# Patient Record
Sex: MALE | HISPANIC OR LATINO | URBAN - METROPOLITAN AREA
[De-identification: names, ages, dates, MRNs, and addresses within clinical notes are randomized per-mention and may not be internally consistent; named-entity substitution may affect disease eponyms.]

---

## 2022-04-08 ENCOUNTER — EMERGENCY (EMERGENCY)
Facility: HOSPITAL | Age: 47
LOS: 1 days | Discharge: HOME | End: 2022-04-08
Attending: STUDENT IN AN ORGANIZED HEALTH CARE EDUCATION/TRAINING PROGRAM | Admitting: STUDENT IN AN ORGANIZED HEALTH CARE EDUCATION/TRAINING PROGRAM
Payer: MEDICAID

## 2022-04-08 VITALS
SYSTOLIC BLOOD PRESSURE: 123 MMHG | HEART RATE: 79 BPM | TEMPERATURE: 98 F | OXYGEN SATURATION: 100 % | RESPIRATION RATE: 16 BRPM | DIASTOLIC BLOOD PRESSURE: 95 MMHG

## 2022-04-08 VITALS
TEMPERATURE: 97 F | HEART RATE: 88 BPM | OXYGEN SATURATION: 100 % | RESPIRATION RATE: 20 BRPM | SYSTOLIC BLOOD PRESSURE: 132 MMHG | DIASTOLIC BLOOD PRESSURE: 85 MMHG

## 2022-04-08 DIAGNOSIS — Z90.49 ACQUIRED ABSENCE OF OTHER SPECIFIED PARTS OF DIGESTIVE TRACT: Chronic | ICD-10-CM

## 2022-04-08 LAB
ALBUMIN SERPL ELPH-MCNC: 4.5 G/DL — SIGNIFICANT CHANGE UP (ref 3.3–5)
ALP SERPL-CCNC: 87 U/L — SIGNIFICANT CHANGE UP (ref 40–120)
ALT FLD-CCNC: 29 U/L — SIGNIFICANT CHANGE UP (ref 4–41)
ANION GAP SERPL CALC-SCNC: 15 MMOL/L — HIGH (ref 7–14)
APPEARANCE UR: CLEAR — SIGNIFICANT CHANGE UP
AST SERPL-CCNC: 20 U/L — SIGNIFICANT CHANGE UP (ref 4–40)
BACTERIA # UR AUTO: NEGATIVE — SIGNIFICANT CHANGE UP
BASOPHILS # BLD AUTO: 0.07 K/UL — SIGNIFICANT CHANGE UP (ref 0–0.2)
BASOPHILS NFR BLD AUTO: 0.7 % — SIGNIFICANT CHANGE UP (ref 0–2)
BILIRUB SERPL-MCNC: 0.2 MG/DL — SIGNIFICANT CHANGE UP (ref 0.2–1.2)
BILIRUB UR-MCNC: NEGATIVE — SIGNIFICANT CHANGE UP
BUN SERPL-MCNC: 14 MG/DL — SIGNIFICANT CHANGE UP (ref 7–23)
CALCIUM SERPL-MCNC: 9.1 MG/DL — SIGNIFICANT CHANGE UP (ref 8.4–10.5)
CHLORIDE SERPL-SCNC: 101 MMOL/L — SIGNIFICANT CHANGE UP (ref 98–107)
CO2 SERPL-SCNC: 22 MMOL/L — SIGNIFICANT CHANGE UP (ref 22–31)
COLOR SPEC: YELLOW — SIGNIFICANT CHANGE UP
CREAT SERPL-MCNC: 1.08 MG/DL — SIGNIFICANT CHANGE UP (ref 0.5–1.3)
DIFF PNL FLD: ABNORMAL
EGFR: 85 ML/MIN/1.73M2 — SIGNIFICANT CHANGE UP
EOSINOPHIL # BLD AUTO: 0.34 K/UL — SIGNIFICANT CHANGE UP (ref 0–0.5)
EOSINOPHIL NFR BLD AUTO: 3.4 % — SIGNIFICANT CHANGE UP (ref 0–6)
EPI CELLS # UR: 0 /HPF — SIGNIFICANT CHANGE UP (ref 0–5)
GLUCOSE SERPL-MCNC: 85 MG/DL — SIGNIFICANT CHANGE UP (ref 70–99)
GLUCOSE UR QL: NEGATIVE — SIGNIFICANT CHANGE UP
HCT VFR BLD CALC: 43.4 % — SIGNIFICANT CHANGE UP (ref 39–50)
HGB BLD-MCNC: 14.7 G/DL — SIGNIFICANT CHANGE UP (ref 13–17)
HYALINE CASTS # UR AUTO: 1 /LPF — SIGNIFICANT CHANGE UP (ref 0–7)
IANC: 6.21 K/UL — SIGNIFICANT CHANGE UP (ref 1.8–7.4)
IMM GRANULOCYTES NFR BLD AUTO: 0.5 % — SIGNIFICANT CHANGE UP (ref 0–1.5)
KETONES UR-MCNC: NEGATIVE — SIGNIFICANT CHANGE UP
LEUKOCYTE ESTERASE UR-ACNC: NEGATIVE — SIGNIFICANT CHANGE UP
LYMPHOCYTES # BLD AUTO: 2.46 K/UL — SIGNIFICANT CHANGE UP (ref 1–3.3)
LYMPHOCYTES # BLD AUTO: 24.4 % — SIGNIFICANT CHANGE UP (ref 13–44)
MCHC RBC-ENTMCNC: 32 PG — SIGNIFICANT CHANGE UP (ref 27–34)
MCHC RBC-ENTMCNC: 33.9 GM/DL — SIGNIFICANT CHANGE UP (ref 32–36)
MCV RBC AUTO: 94.6 FL — SIGNIFICANT CHANGE UP (ref 80–100)
MONOCYTES # BLD AUTO: 0.94 K/UL — HIGH (ref 0–0.9)
MONOCYTES NFR BLD AUTO: 9.3 % — SIGNIFICANT CHANGE UP (ref 2–14)
NEUTROPHILS # BLD AUTO: 6.21 K/UL — SIGNIFICANT CHANGE UP (ref 1.8–7.4)
NEUTROPHILS NFR BLD AUTO: 61.7 % — SIGNIFICANT CHANGE UP (ref 43–77)
NITRITE UR-MCNC: NEGATIVE — SIGNIFICANT CHANGE UP
NRBC # BLD: 0 /100 WBCS — SIGNIFICANT CHANGE UP
NRBC # FLD: 0 K/UL — SIGNIFICANT CHANGE UP
PH UR: 5.5 — SIGNIFICANT CHANGE UP (ref 5–8)
PLATELET # BLD AUTO: 363 K/UL — SIGNIFICANT CHANGE UP (ref 150–400)
POTASSIUM SERPL-MCNC: 4 MMOL/L — SIGNIFICANT CHANGE UP (ref 3.5–5.3)
POTASSIUM SERPL-SCNC: 4 MMOL/L — SIGNIFICANT CHANGE UP (ref 3.5–5.3)
PROT SERPL-MCNC: 7.6 G/DL — SIGNIFICANT CHANGE UP (ref 6–8.3)
PROT UR-MCNC: ABNORMAL
RBC # BLD: 4.59 M/UL — SIGNIFICANT CHANGE UP (ref 4.2–5.8)
RBC # FLD: 13.6 % — SIGNIFICANT CHANGE UP (ref 10.3–14.5)
RBC CASTS # UR COMP ASSIST: 39 /HPF — HIGH (ref 0–4)
SODIUM SERPL-SCNC: 138 MMOL/L — SIGNIFICANT CHANGE UP (ref 135–145)
SP GR SPEC: 1.02 — SIGNIFICANT CHANGE UP (ref 1–1.05)
UROBILINOGEN FLD QL: SIGNIFICANT CHANGE UP
WBC # BLD: 10.07 K/UL — SIGNIFICANT CHANGE UP (ref 3.8–10.5)
WBC # FLD AUTO: 10.07 K/UL — SIGNIFICANT CHANGE UP (ref 3.8–10.5)
WBC UR QL: 3 /HPF — SIGNIFICANT CHANGE UP (ref 0–5)

## 2022-04-08 PROCEDURE — 74176 CT ABD & PELVIS W/O CONTRAST: CPT | Mod: 26,GC,MB

## 2022-04-08 PROCEDURE — 99285 EMERGENCY DEPT VISIT HI MDM: CPT

## 2022-04-08 RX ORDER — MORPHINE SULFATE 50 MG/1
4 CAPSULE, EXTENDED RELEASE ORAL ONCE
Refills: 0 | Status: DISCONTINUED | OUTPATIENT
Start: 2022-04-08 | End: 2022-04-08

## 2022-04-08 RX ORDER — KETOROLAC TROMETHAMINE 30 MG/ML
15 SYRINGE (ML) INJECTION ONCE
Refills: 0 | Status: DISCONTINUED | OUTPATIENT
Start: 2022-04-08 | End: 2022-04-08

## 2022-04-08 RX ORDER — SODIUM CHLORIDE 9 MG/ML
1000 INJECTION INTRAMUSCULAR; INTRAVENOUS; SUBCUTANEOUS ONCE
Refills: 0 | Status: COMPLETED | OUTPATIENT
Start: 2022-04-08 | End: 2022-04-08

## 2022-04-08 RX ADMIN — Medication 15 MILLIGRAM(S): at 20:30

## 2022-04-08 RX ADMIN — MORPHINE SULFATE 4 MILLIGRAM(S): 50 CAPSULE, EXTENDED RELEASE ORAL at 21:59

## 2022-04-08 RX ADMIN — SODIUM CHLORIDE 2000 MILLILITER(S): 9 INJECTION INTRAMUSCULAR; INTRAVENOUS; SUBCUTANEOUS at 20:30

## 2022-04-08 NOTE — ED PROVIDER NOTE - NSFOLLOWUPINSTRUCTIONS_ED_ALL_ED_FT
Joseph ibuprofeno 400 mg y acetaminophen 650 mg cada 6 horas con comida  si tenga dolor.     Cómo filtrar peñaloza orina    LO QUE NECESITA SABER:    Orine en un colador (embudo con junie ronda veronica en la parte inferior) o un recipiente de [place]ston para recoger los cálculos renales.  Buscar piedras en el filtro    INSTRUCCIONES SOBRE EL ATUL HOSPITALARIA:    Medicamentos:    Analgésicos:Usted podría recibir medicamento para quitarle o reducir el dolor. No espere a que el dolor sea muy intenso para kelsey el medicamento.    AINEs (analgésicos antiinflamatorios no esteroides):Estos medicamentos disminuyen la inflamación, dolor y fiebre. Los AINEs se pueden obtener sin receta médica. Pregunte cuál de estos medicamentos es apropiado para usted. Pregunte cuánto kelsey y cuándo. Tómelos rupert se le indique. Cuando no se mickey de la manera indicada, los medicamentos antiinflamatorios no esteroides pueden causar sangrado estomacal y problemas renales.    Medicamento para las náuseas:Tiny medicamento calma el estómago y claudy o controla los vómitos.    [place] linda medicamentos rupert se le haya indicado.Consulte con peñaloza médico si usted juan c que peñaloza medicamento no le está ayudando o si presenta efectos secundarios. Infórmele si es alérgico a algún medicamento. Mantenga junie lista actualizada de los medicamentos, las vitaminas y los productos herbales que joseph. Incluya los siguientes datos de los medicamentos: cantidad, frecuencia y motivo de administración. Traiga con usted la lista o los envases de las píldoras a linda citas de seguimiento. Lleve la lista de los medicamentos con usted en navi de junie emergencia.  [place] líquidos según linda indicaciones:Elisabet unos 3 litros de líquido cada día o la cantidad que le indiquen. [place] equivale a aproximadamente 12 vasos de agua o jugo de fruta. La mitad de la cantidad total de líquido que joseph a diario debe ser agua. Limite la cantidad de café, té y gaseosas que joseph a 2 vasos al día. Peñaloza orina será pálida y denis si está bebiendo suficiente líquido.    Cuidados personales:    Actividad:El ejercicio, rupert caminar, puede ayudar a disminuir el dolor.    Evite el calor:Es posible que transpire, orine menos y se deshidrate cuando siente calor.  Programe junie enmanuel con peñaloza médico o urólogo rupert se le indique:Anote linda preguntas para que se acuerde de hacerlas caitlin linda visitas.    Comuníquese con peñaloza médico o urólogo si:    Usted tiene fiebre y escalofríos.    Peñaloza orina es turbia o huele mal.    Siente ardor cuando orina.    Tiene dificultad para orinar.    Tiene vómitos que no mejoran, incluso después de kelsey el medicamento.    Usted tiene preguntas o inquietudes acerca de peñaloza condición o cuidado.  Regrese a la remi de emergencias si:    Usted no puede orinar.    Siente un dolor muy intenso en la parte inferior del abdomen o en el costado.    Peñaloza corazón late de forma irregular o más rápido que de costumbre.

## 2022-04-08 NOTE — ED PROVIDER NOTE - PATIENT PORTAL LINK FT
You can access the FollowMyHealth Patient Portal offered by [facility] by registering at the following website: [email or web address]. By joining [facility]’s FollowMyHealth portal, you will also be able to view your health information using other applications (apps) compatible with our system.

## 2022-04-08 NOTE — ED ADULT NURSE NOTE - OBJECTIVE STATEMENT
pt. received to int. 3 A&Ox4 ambulatory cares for self p/w L sided flank pain. pt. endorses pain started this morning with no relief. rates is as constant, sharp pain at a 10. endorses increased urinary frequency and denies burning or blood in the urine. respirations even and unlabored. pt. visibly uncomfortable at this time. denies CP, SOB, HA, NVD, Fever, Chills. 20g IV placed in the R AC. Labs drawn and sent. pt. medicated as ordered. comfort measures provided. safety precautions maintained.

## 2022-04-08 NOTE — ED PROVIDER NOTE - NS ED ROS FT
CONSTITUTIONAL: No fever,  EYES: No redness  ENT: no sore throat  CARDIOVASCULAR: No chest pain,  RESPIRATORY: No cough, no shortness of breath  GI: +flank pain, no nausea, no vomiting,  GENITOURINARY: +urinary hesitance, no testicular pain  MUSKULOSKELETAL: No new pain in joints/muscles  SKIN: No rash  NEURO: No headache  ALL OTHER SYSTEMS NEGATIVE.

## 2022-04-08 NOTE — ED PROVIDER NOTE - PROGRESS NOTE DETAILS
pain well controlled, discharge with uro follow up. Return precautions reviewed. Patient verbalized understand of conditions for return and plan for follow up.

## 2022-04-08 NOTE — ED ADULT TRIAGE NOTE - CHIEF COMPLAINT QUOTE
left sided back pain with radiations to left lower abdominal pain. pt urinating blood, states he is unable to empty bladder. pain started today at 2am. pt uncomfortable

## 2022-04-08 NOTE — ED PROVIDER NOTE - CLINICAL SUMMARY MEDICAL DECISION MAKING FREE TEXT BOX
47 y/o M w/ PMHx of appendicitis s/p appendectomy c/o severe sudden onset left flank pain radiating to the left lower quadrant since 2 AM today. Most likely renal stone. CT scan has been ordered. Low concern for intraabdominal surgical pathology. Will reassess after pain control.

## 2022-04-08 NOTE — ED PROVIDER NOTE - PHYSICAL EXAMINATION
CONSTITUTIONAL: Non-toxic, non-diaphoretic, in no apparent distress  HEAD: Normocephalic; atruamatic  EYES: EOM intact   ENMT: External appears normal; normal oropharynx, moist  NECK: grossly normal active ROM,  CARD: No cyanosis, good peripheral perfusion, RRR  RESP: Normal chest excursion with respiration; no increased work of breathing  ABD: abdomen soft and nontender, minimal left flank tenderness  EXT: moving all extremities, no gross disfigurement or asymmetry,  SKIN: Warm, dry, no rash  NEURO:  moving all extremities, no facial droop, no dysarthria      cn2-12 intact  5/5 all extremities

## 2022-04-08 NOTE — ED PROVIDER NOTE - OBJECTIVE STATEMENT
47 y/o M w/ PMHx of appendicitis s/p appendectomy c/o severe sudden onset left flank pain radiating to the left lower quadrant since 2 AM today. Pt states that he took an Advil upon waking up but that the pain has continued to be persistent at work so he has presented to the ED. Pt also reports having blood tinged urine. Pt denies testicular pain, fever, or any other symptoms at this time. I speak the patients language (Swedish) fluently, therefore no  was required.

## 2022-04-09 LAB
CULTURE RESULTS: SIGNIFICANT CHANGE UP
SPECIMEN SOURCE: SIGNIFICANT CHANGE UP

## 2022-04-18 PROBLEM — K37 UNSPECIFIED APPENDICITIS: Chronic | Status: ACTIVE | Noted: 2022-04-08

## 2022-04-29 ENCOUNTER — APPOINTMENT (OUTPATIENT)
Dept: UROLOGY | Facility: CLINIC | Age: 47
End: 2022-04-29

## 2024-04-02 ENCOUNTER — LABORATORY RESULT (OUTPATIENT)
Age: 49
End: 2024-04-02

## 2024-04-02 ENCOUNTER — OUTPATIENT (OUTPATIENT)
Dept: OUTPATIENT SERVICES | Facility: HOSPITAL | Age: 49
LOS: 1 days | End: 2024-04-02
Payer: SELF-PAY

## 2024-04-02 ENCOUNTER — APPOINTMENT (OUTPATIENT)
Dept: INTERNAL MEDICINE | Facility: CLINIC | Age: 49
End: 2024-04-02
Payer: SELF-PAY

## 2024-04-02 VITALS
DIASTOLIC BLOOD PRESSURE: 82 MMHG | OXYGEN SATURATION: 97 % | HEIGHT: 71 IN | BODY MASS INDEX: 37.24 KG/M2 | HEART RATE: 87 BPM | WEIGHT: 266 LBS | SYSTOLIC BLOOD PRESSURE: 124 MMHG

## 2024-04-02 DIAGNOSIS — Z90.49 ACQUIRED ABSENCE OF OTHER SPECIFIED PARTS OF DIGESTIVE TRACT: Chronic | ICD-10-CM

## 2024-04-02 DIAGNOSIS — I10 ESSENTIAL (PRIMARY) HYPERTENSION: ICD-10-CM

## 2024-04-02 DIAGNOSIS — Z83.438 FAMILY HISTORY OF OTHER DISORDER OF LIPOPROTEIN METABOLISM AND OTHER LIPIDEMIA: ICD-10-CM

## 2024-04-02 DIAGNOSIS — E66.9 OBESITY, UNSPECIFIED: ICD-10-CM

## 2024-04-02 DIAGNOSIS — R06.83 SNORING: ICD-10-CM

## 2024-04-02 DIAGNOSIS — Z82.49 FAMILY HISTORY OF ISCHEMIC HEART DISEASE AND OTHER DISEASES OF THE CIRCULATORY SYSTEM: ICD-10-CM

## 2024-04-02 DIAGNOSIS — Z00.00 ENCOUNTER FOR GENERAL ADULT MEDICAL EXAMINATION W/OUT ABNORMAL FINDINGS: ICD-10-CM

## 2024-04-02 DIAGNOSIS — M25.562 PAIN IN RIGHT KNEE: ICD-10-CM

## 2024-04-02 DIAGNOSIS — M25.512 PAIN IN LEFT SHOULDER: ICD-10-CM

## 2024-04-02 DIAGNOSIS — M25.561 PAIN IN RIGHT KNEE: ICD-10-CM

## 2024-04-02 PROCEDURE — 80061 LIPID PANEL: CPT

## 2024-04-02 PROCEDURE — T1013: CPT

## 2024-04-02 PROCEDURE — 87389 HIV-1 AG W/HIV-1&-2 AB AG IA: CPT

## 2024-04-02 PROCEDURE — 83036 HEMOGLOBIN GLYCOSYLATED A1C: CPT

## 2024-04-02 PROCEDURE — 99214 OFFICE O/P EST MOD 30 MIN: CPT | Mod: GC

## 2024-04-02 PROCEDURE — 86803 HEPATITIS C AB TEST: CPT

## 2024-04-02 PROCEDURE — 87521 HEPATITIS C PROBE&RVRS TRNSC: CPT

## 2024-04-02 PROCEDURE — G0463: CPT

## 2024-04-04 NOTE — END OF VISIT
[] : Resident [FreeTextEntry3] : The patient is a 48-year-old man with a history of nephrolithiasis who is presenting to [facility] care as a new patient and who has noted symptoms of shoulder pain (most likely secondary to rotator cuff tendinopathy), knee pain (likely secondary to osteoarthritis), and difficulty sleeping (most likely in the setting of sleep apnea).    Rotator cuff tendinopathy: -Pain noted in proximal humerus of left upper extremity; pain reproduced on exam with action of the supraspinatus and subscapularis; pain not reproduced with action of biceps, triceps, deltoids, infraspinatus; mild deficits to passive range of motion   -Suspect pain most likely secondary to tendinopathy of supraspinatus; less likely pathology of the glenoid labrum; suspect pain localized to proximal humerus is referred; will send patient for x ray but low suspicion that bony pathology is etiology of symptoms  -Will refer to physical therapy for management of tendinopathy   Knee pain: -Pain in medial knees bilaterally in setting of progressive weight gain  -Suspect that knee pain is most likely secondary to osteoarthritis in setting of obesity  -Physical therapy referral made; will continue to work with patient to try to mitigate weight gain   Suspected sleep apnea: -Snoring and choking noted at night by patient's partner  -Worry for sleep apnea in setting of obesity; patient has elevated stop bang score as per my colleagues  -Will refer to sleep center for sleep study   Obesity: -Patient noted to have elevated bmi; weight gain has contributed to depressive symptoms  -Will attempt multi-faceted treatment--manage suspected sleep apnea, exercise (physical therapy), dietary modification  Prophylactic measure: -Colonoscopy referral made  -HCV and HIV tests needed  -Flu, covid, tdap, hep b vaccines needed

## 2024-04-04 NOTE — HISTORY OF PRESENT ILLNESS
[FreeTextEntry1] : New patient CPE [de-identified] : 47 y/o M w/ HLD, nephrolithiasis, elevated BMI presenting today to [facility] care.  Patient has not seen a physician since an ED visit 2 years ago at [facility] for which he urinated a kidney stone that was noted on CT A/P. He currently has not experienced his symptoms since that episode.   Patient has been having pain in his joints. He says the pain is particularly in his left shoulder and b/l knees. He says his left shoulder began hurting after he received a covid shot two years ago. He describes the pain as a "stinging" pain that begins on the lateral aspect of his upper arm that radiates up towards the shoulder and to his elbow that is exacerbated by abduction of the shoulder. He said normally the pain didnt bother him but lately for the last three months he noticed the pain has worsened and hurts over a large area. He says the pain has been persistent at all times over the last two years. Has tried OTC acetaminophen with mild alleviation. He denies feeling a mass or swelling, rash, fevers, chills. He works as a  and helps fix stoves/machines. He is right handed. Does not have any hobbies.  His b/l knee pain began 6-8 months ago. The pain is described as an aching pain located on the lateral side of the knees that lasts roughly 4 hrs a day and is exacerbated by hip flexion. Has tried OTC tylenol with mild alleviation. He has gained weight which he cant quantify but has noted he went from a XL size to an XXL. Because of his weight gain, his mood has been affected and he has been feeling down due to difficulties trying to lose weight. He has noted difficulty sleeping. He is currently  but notes that his previous wife said he snored a lot. He does not feel he gets good sleep quality and does not feel well rested after he sleeps. He is unable to sleep on his back because he does not feel comfortable.

## 2024-04-04 NOTE — HEALTH RISK ASSESSMENT
[2] : 1) Little interest or pleasure doing things for more than half of the days (2) [1] : 2) Feeling down, depressed, or hopeless for several days (1) [No] : In the past 12 months have you used drugs other than those required for medical reasons? No [PHQ-2 Positive] : PHQ-2 Positive [Nearly Every Day (3)] : 4.) Feeling tired or having little energy? Nearly every day [Several Days (1)] : 6.) Feeling bad about yourself, or that you are a failure, or have let yourself or your family down? Several days [1/2 of Days or More (2)] : 7.) Trouble concentrating on things, such as reading a newspaper or watching television? Half the days or more [Moderate] : Severity of Depression is Moderate [Not at All (0)] : 9.) Thoughts that you would be off dead or of hurting yourself in some way? Not at all [I have developed a follow-up plan documented below in the note.] : I have developed a follow-up plan documented below in the note. [Never] : Never [[id number]] : 3 [[id number]] : 12

## 2024-04-04 NOTE — PHYSICAL EXAM
[No JVD] : no jugular venous distention [Supple] : supple [No Varicosities] : no varicosities [No Edema] : there was no peripheral edema [Pedal Pulses Present] : the pedal pulses are present [Soft] : abdomen soft [No Extremity Clubbing/Cyanosis] : no extremity clubbing/cyanosis [Non Tender] : non-tender [No Masses] : no abdominal mass palpated [Normal Bowel Sounds] : normal bowel sounds [Coordination Grossly Intact] : coordination grossly intact [Normal Gait] : normal gait [No Focal Deficits] : no focal deficits [No Joint Swelling] : no joint swelling [Grossly Normal Strength/Tone] : grossly normal strength/tone [Normal] : affect was normal and insight and judgment were intact [de-identified] : Tenderness to palpation through left bicep, tricep, shoulder and upper trapezius. Noted pain with left active shoulder abduction without and with resistance. No pain with left elbow flexion and extension. Positive open can test and lag test on left side. Negative anterior/posterior drawer test as well as varus/valgus stress test b/l. No knee crepitus noted. Pain with palpation b/l knees at medial side

## 2024-04-04 NOTE — COUNSELING
[Potential consequences of obesity discussed] : Potential consequences of obesity discussed [Benefits of weight loss discussed] : Benefits of weight loss discussed [FreeTextEntry4] : 20

## 2024-04-04 NOTE — INTERPRETER SERVICES
[[employer] ] : provided by [employer]   [Time Spent: ____ minutes] : Total time spent using  services: [unfilled] minutes. The patient's primary language is not English thus required  services. [Interpreters_IDNumber] : [id number] [TWNoteComboBox1] : [place]

## 2024-04-04 NOTE — ASSESSMENT
[FreeTextEntry1] : 47 y/o M w/ HLD, nephrolithiasis, elevated BMI presenting today to establish care.  #Left Shoulder Pain Appears to have evidence suggesting shoulder joint and rotator cuff is the source of his pain but it is atypical that he reports pain mostly in his lateral upper arm in between his tricep and bicep with reproducible pain when palpated throughout his bicep and tricep muscle belly. He does not appears to have any other symptoms suggestive of a primary auitoimmune cause. Because of this, will opt to treat with conservative measures and treat as musculoskeletal. - PT referral given - Patient to use OTC NSAIDS PRN - Given his atypical arm pain that does not seem to be well explained by shoulder joint pain, will obtain XR humerus to rule out any underlying bone pathology.  #B/l knee pain Only notable physical exam finding is pain reproducible with medial knee palpation suggesting that patient symptoms may be due to pes anserine bursitis.  - Patient to use OTC NSAIDS PRN - PT referral given - Benefits of weight loss discussed to help preserve knee joint health  #Snoring #Elevated PHQ-9 #Weight gain Patient noted to have elevated PHQ-9 at score of 12 with no SI/HI. Which he attributes to his inability to lose weight. He has been making dietary adjustments and was previously seen by a nutritionist in his home country of [place] and feels he has an understanding of his nutrition but still is unable to lose weight. On further evaluation, it appears he has evidence of possible BAM with constant fatigue throughout the day as well as heavy snoring. - STOPBANG score 5. Will obtain sleep study test - Suspect his elevated PHQ-9 may be secondary to other causes rather than primary depression. Patient in agreement to hold off on therapy/medication for depression at this time while doing further evaluations - Patient offered nutritionist ronald but he is not interested in nutritionist given he was previously seeing one. He is interested in medication to help with weight loss. Will send weight loss management referral. I suspect that if he has underlying BAM, if it is appropriately treated (may have issues obtaining CPAP until patient can successfully obtain sliding scale) his weight as well as elevated PHQ-9 may be positively affected.  #HCM - a1c, lipid, hiv, hep c - Will attempt to obtain vaccine records - colonoscopy referral given -

## 2024-04-05 LAB
CHOLEST SERPL-MCNC: 266 MG/DL
ESTIMATED AVERAGE GLUCOSE: 117 MG/DL
HBA1C MFR BLD HPLC: 5.7 %
HCV AB SER QL: REACTIVE
HCV S/CO RATIO: 6.45 S/CO
HDLC SERPL-MCNC: 44 MG/DL
HIV1+2 AB SPEC QL IA.RAPID: NONREACTIVE
LDLC SERPL CALC-MCNC: 180 MG/DL
NONHDLC SERPL-MCNC: 222 MG/DL
TRIGL SERPL-MCNC: 224 MG/DL

## 2024-04-08 DIAGNOSIS — R06.83 SNORING: ICD-10-CM

## 2024-04-08 DIAGNOSIS — M25.562 PAIN IN LEFT KNEE: ICD-10-CM

## 2024-04-08 DIAGNOSIS — Z83.438 FAMILY HISTORY OF OTHER DISORDER OF LIPOPROTEIN METABOLISM AND OTHER LIPIDEMIA: ICD-10-CM

## 2024-04-08 DIAGNOSIS — Z82.49 FAMILY HISTORY OF ISCHEMIC HEART DISEASE AND OTHER DISEASES OF THE CIRCULATORY SYSTEM: ICD-10-CM

## 2024-04-08 DIAGNOSIS — M25.561 PAIN IN RIGHT KNEE: ICD-10-CM

## 2024-04-08 DIAGNOSIS — M25.512 PAIN IN LEFT SHOULDER: ICD-10-CM

## 2024-04-08 DIAGNOSIS — E66.9 OBESITY, UNSPECIFIED: ICD-10-CM
